# Patient Record
Sex: MALE | Race: AMERICAN INDIAN OR ALASKA NATIVE | NOT HISPANIC OR LATINO | Employment: FULL TIME | URBAN - METROPOLITAN AREA
[De-identification: names, ages, dates, MRNs, and addresses within clinical notes are randomized per-mention and may not be internally consistent; named-entity substitution may affect disease eponyms.]

---

## 2018-05-11 ENCOUNTER — OFFICE VISIT (OUTPATIENT)
Dept: URGENT CARE | Facility: CLINIC | Age: 28
End: 2018-05-11
Payer: COMMERCIAL

## 2018-05-11 ENCOUNTER — HOSPITAL ENCOUNTER (OUTPATIENT)
Facility: MEDICAL CENTER | Age: 28
End: 2018-05-11
Attending: PHYSICIAN ASSISTANT
Payer: COMMERCIAL

## 2018-05-11 VITALS
TEMPERATURE: 99.4 F | RESPIRATION RATE: 18 BRPM | WEIGHT: 225.2 LBS | SYSTOLIC BLOOD PRESSURE: 134 MMHG | OXYGEN SATURATION: 98 % | DIASTOLIC BLOOD PRESSURE: 94 MMHG | HEART RATE: 100 BPM | HEIGHT: 66 IN | BODY MASS INDEX: 36.19 KG/M2

## 2018-05-11 DIAGNOSIS — N45.1 EPIDIDYMITIS: ICD-10-CM

## 2018-05-11 LAB
C TRACH DNA SPEC QL NAA+PROBE: NEGATIVE
N GONORRHOEA DNA SPEC QL NAA+PROBE: NEGATIVE
SPECIMEN SOURCE: NORMAL

## 2018-05-11 PROCEDURE — 87491 CHLMYD TRACH DNA AMP PROBE: CPT

## 2018-05-11 PROCEDURE — 87591 N.GONORRHOEAE DNA AMP PROB: CPT

## 2018-05-11 PROCEDURE — 87086 URINE CULTURE/COLONY COUNT: CPT

## 2018-05-11 PROCEDURE — 99214 OFFICE O/P EST MOD 30 MIN: CPT | Performed by: PHYSICIAN ASSISTANT

## 2018-05-11 ASSESSMENT — ENCOUNTER SYMPTOMS
BACK PAIN: 0
COUGH: 0
FLANK PAIN: 0
FEVER: 0
PALPITATIONS: 0
SHORTNESS OF BREATH: 0
CHILLS: 0

## 2018-05-11 ASSESSMENT — PAIN SCALES - GENERAL: PAINLEVEL: 2=MINIMAL-SLIGHT

## 2018-05-11 NOTE — PROGRESS NOTES
Subjective:      Tano Syed is a 27 y.o. male who presents with Dysuria (burning and now sore testicles, )            Dysuria    This is a new problem. The current episode started in the past 7 days. The pain is mild. He is sexually active. There is no history of pyelonephritis. Pertinent negatives include no chills, flank pain, frequency, hematuria or urgency. Associated symptoms comments: Testicular pain  .       Review of Systems   Constitutional: Negative for chills and fever.   Respiratory: Negative for cough and shortness of breath.    Cardiovascular: Negative for chest pain and palpitations.   Genitourinary: Positive for dysuria. Negative for flank pain, frequency, hematuria and urgency.        Testicular pain.   Musculoskeletal: Negative for back pain.   All other systems reviewed and are negative.    PMH:  has no past medical history on file.  MEDS:   Current Outpatient Prescriptions:   •  acetaminophen (TYLENOL) 325 MG Tab, Take 650 mg by mouth every four hours as needed., Disp: , Rfl:   •  bismuth subsalicylate (PEPTO-BISMOL) 262 MG Chew Tab, Take 524 mg by mouth 4 Times a Day,Before Meals and at Bedtime., Disp: , Rfl:   •  ondansetron (ZOFRAN) 4 MG Tab tablet, Take 1 Tab by mouth every four hours as needed for Nausea/Vomiting., Disp: 20 Tab, Rfl: 1  •  loperamide (IMODIUM) 2 MG Cap, Take 1 Cap by mouth 4 times a day as needed for Diarrhea., Disp: 30 Cap, Rfl: 3  •  tramadol (ULTRAM) 50 MG Tab, Take 1-2 Tabs by mouth every four hours as needed., Disp: 30 Tab, Rfl: 0  •  naproxen (NAPROSYN) 500 MG Tab, Take 500 mg by mouth Once., Disp: , Rfl:   •  ibuprofen (ADVIL) 200 MG Tab, Take 400 mg by mouth every 6 hours as needed for Mild Pain or Inflammation., Disp: , Rfl:   ALLERGIES: No Known Allergies  SURGHX:   Past Surgical History:   Procedure Laterality Date   • MANDIBLE FRACTURE ORIF Left 9/21/2015    Procedure: MANDIBLE FRACTURE ORIF;  Surgeon: Ryan Machado D.M.D.,M.D.;  Location: SURGERY [facility]  "TOWER ORS;  Service:    • OTHER ORTHOPEDIC SURGERY      knee     SOCHX:  reports that he has never smoked. He uses smokeless tobacco. He reports that he drinks about 1.8 oz of alcohol per week . He reports that he does not use drugs.  FH: Family history was reviewed, no pertinent findings to report  Medications, Allergies, and current problem list reviewed today in Epic       Objective:     /94   Pulse 100   Temp 37.4 °C (99.4 °F)   Resp 18   Ht 1.676 m (5' 6\")   Wt 102.2 kg (225 lb 3.2 oz)   SpO2 98%   BMI 36.35 kg/m²      Physical Exam   Constitutional: He is oriented to person, place, and time. He appears well-developed and well-nourished.   Neck: Normal range of motion. Neck supple.   Cardiovascular: Normal rate, regular rhythm and normal heart sounds.    Pulmonary/Chest: Effort normal and breath sounds normal.   Genitourinary: Penis normal. Right testis shows tenderness. Uncircumcised.   Musculoskeletal: He exhibits no tenderness.   No CVA tenderness   Neurological: He is alert and oriented to person, place, and time.   Skin: Skin is warm and dry.   Psychiatric: He has a normal mood and affect. His behavior is normal. Judgment and thought content normal.   Vitals reviewed.              Assessment/Plan:   Patient is a 27-year-old male who presents with burning with urination and testicular pain for a week. He denies any discharge from the penis. He is sexually active. Vitals normal. Tenderness palpated on the backside of the right testes, no swelling. Cremaster reflex present.UA is normal. Most likely epididymitis. We discussed etiology of this diagnosis and he would like to wait for urine cultures to come back before treatment.  1. Epididymitis    - CHLAMYDIA/GC PCR URINE OR SWAB; Future  - POCT Urinalysis  - URINE CULTURE(NEW); Future    Differential diagnosis, natural history, supportive care discussed. Follow-up with primary care provider within 7-10 days, emergency room precautions discussed.  " Patient and/or family appears understanding of information.

## 2018-05-11 NOTE — PATIENT INSTRUCTIONS
Epididymitis  Introduction  Epididymitis is swelling (inflammation) of the epididymis. The epididymis is a cord-like structure that is located along the top and back part of the testicle. It collects and stores sperm from the testicle.  This condition can also cause pain and swelling of the testicle and scrotum. Symptoms usually start suddenly (acute epididymitis). Sometimes epididymitis starts gradually and lasts for a while (chronic epididymitis). This type may be harder to treat.  What are the causes?  In men 35 and younger, this condition is usually caused by a bacterial infection or sexually transmitted disease (STD), such as:  · Gonorrhea.  · Chlamydia.  In men 35 and older who do not have anal sex, this condition is usually caused by bacteria from a blockage or abnormalities in the urinary system. These can result from:  · Having a tube placed into the bladder (urinary catheter).  · Having an enlarged or inflamed prostate gland.  · Having recent urinary tract surgery.  In men who have a condition that weakens the body’s defense system (immune system), such as HIV, this condition can be caused by:  · Other bacteria, including tuberculosis and syphilis.  · Viruses.  · Fungi.  Sometimes this condition occurs without infection. That may happen if urine flows backward into the epididymis after heavy lifting or straining.  What increases the risk?  This condition is more likely to develop in men:  · Who have unprotected sex with more than one partner.  · Who have anal sex.  · Who have recently had surgery.  · Who have a urinary catheter.  · Who have urinary problems.  · Who have a suppressed immune system.  What are the signs or symptoms?  This condition usually begins suddenly with chills, fever, and pain behind the scrotum and in the testicle. Other symptoms include:  · Swelling of the scrotum, testicle, or both.  · Pain when ejaculating or urinating.  · Pain in the back or belly.  · Nausea.  · Itching and  discharge from the penis.  · Frequent need to pass urine.  · Redness and tenderness of the scrotum.  How is this diagnosed?  Your health care provider can diagnose this condition based on your symptoms and medical history. Your health care provider will also do a physical exam to ask about your symptoms and check your scrotum and testicle for swelling, pain, and redness. You may also have other tests, including:  · Examination of discharge from the penis.  · Urine tests for infections, such as STDs.  Your health care provider may test you for other STDs, including HIV.  How is this treated?  Treatment for this condition depends on the cause. If your condition is caused by a bacterial infection, oral antibiotic medicine may be prescribed. If the bacterial infection has spread to your blood, you may need to receive IV antibiotics. Nonbacterial epididymitis is treated with home care that includes bed rest and elevation of the scrotum.  Surgery may be needed to treat:  · Bacterial epididymitis that causes pus to build up in the scrotum (abscess).  · Chronic epididymitis that has not responded to other treatments.  Follow these instructions at home:  Medicines  · Take over-the-counter and prescription medicines only as told by your health care provider.  · If you were prescribed an antibiotic medicine, take it as told by your health care provider. Do not stop taking the antibiotic even if your condition improves.  Sexual Activity  · If your epididymitis was caused by an STD, avoid sexual activity until your treatment is complete.  · Inform your sexual partner or partners if you test positive for an STD. They may need to be treated. Do not engage in sexual activity with your partner or partners until their treatment is completed.  General instructions  · Return to your normal activities as told by your health care provider. Ask your health care provider what activities are safe for you.  · Keep your scrotum elevated and  supported while resting. Ask your health care provider if you should wear a scrotal support, such as a jockstrap. Wear it as told by your health care provider.  · If directed, apply ice to the affected area:  ¨ Put ice in a plastic bag.  ¨ Place a towel between your skin and the bag.  ¨ Leave the ice on for 20 minutes, 2-3 times per day.  · Try taking a sitz bath to help with discomfort. This is a warm water bath that is taken while you are sitting down. The water should only come up to your hips and should cover your buttocks. Do this 3-4 times per day or as told by your health care provider.  · Keep all follow-up visits as told by your health care provider. This is important.  Contact a health care provider if:  · You have a fever.  · Your pain medicine is not helping.  · Your pain is getting worse.  · Your symptoms do not improve within three days.  This information is not intended to replace advice given to you by your health care provider. Make sure you discuss any questions you have with your health care provider.  Document Released: 12/15/2001 Document Revised: 05/25/2017 Document Reviewed: 05/04/2016  © 2017 [employer]

## 2018-05-12 ENCOUNTER — OFFICE VISIT (OUTPATIENT)
Dept: URGENT CARE | Facility: CLINIC | Age: 28
End: 2018-05-12
Payer: COMMERCIAL

## 2018-05-12 VITALS
HEART RATE: 78 BPM | DIASTOLIC BLOOD PRESSURE: 72 MMHG | TEMPERATURE: 98.1 F | SYSTOLIC BLOOD PRESSURE: 122 MMHG | OXYGEN SATURATION: 95 % | HEIGHT: 66 IN | WEIGHT: 225 LBS | BODY MASS INDEX: 36.16 KG/M2 | RESPIRATION RATE: 18 BRPM

## 2018-05-12 DIAGNOSIS — N45.1 EPIDIDYMITIS: ICD-10-CM

## 2018-05-12 DIAGNOSIS — Z72.51 UNPROTECTED SEX: ICD-10-CM

## 2018-05-12 PROCEDURE — 99214 OFFICE O/P EST MOD 30 MIN: CPT | Performed by: PHYSICIAN ASSISTANT

## 2018-05-12 RX ORDER — AZITHROMYCIN 1 G/1
1 POWDER, FOR SUSPENSION ORAL ONCE
Qty: 1 EACH | Refills: 0 | Status: SHIPPED | OUTPATIENT
Start: 2018-05-12 | End: 2018-05-12

## 2018-05-12 RX ORDER — CEFTRIAXONE SODIUM 250 MG/1
250 INJECTION, POWDER, FOR SOLUTION INTRAMUSCULAR; INTRAVENOUS ONCE
Status: COMPLETED | OUTPATIENT
Start: 2018-05-12 | End: 2018-05-12

## 2018-05-12 RX ADMIN — CEFTRIAXONE SODIUM 250 MG: 250 INJECTION, POWDER, FOR SOLUTION INTRAMUSCULAR; INTRAVENOUS at 10:44

## 2018-05-12 ASSESSMENT — ENCOUNTER SYMPTOMS
FEVER: 0
CHILLS: 0
FLANK PAIN: 0
NAUSEA: 0
COUGH: 0
VOMITING: 0
MYALGIAS: 0

## 2018-05-12 NOTE — PROGRESS NOTES
"Subjective:      Tano Syed is a 27 y.o. male who presents with Testicular Pain (x4 days)            Subjective: Patient is a 27-year-old male who was seen yesterday for intermittent testicular discomfort on the right side as well.  He was diagnosed with came back today as he said yesterday he elected not to be treated.  Today's 66 to be treated.  Patient denies any increased frequency with urination, increased urgency or hesitancy.  Denies did have one episode of relation 2 days ago.  States he talked with 2 female partners and they  told him that no symptoms.  He denies fever, vomiting, abdominal    Is    Review of Systems   Constitutional: Negative for chills and fever.   Respiratory: Negative for cough.    Gastrointestinal: Negative for nausea and vomiting.   Genitourinary: Negative for dysuria, flank pain, frequency, hematuria and urgency.   Musculoskeletal: Negative for myalgias.   Skin: Negative for itching and rash.          Objective:     /72   Pulse 78   Temp 36.7 °C (98.1 °F)   Resp 18   Ht 1.676 m (5' 6\")   Wt 102.1 kg (225 lb)   SpO2 95%   BMI 36.32 kg/m²      Physical Exam       Gen.: Patient is A and O ×3, no acute distress, well-nourished well-hydrated  Vitals: Are listed and unremarkable  HEENT: Heads normocephalic, atraumatic, PERRLA, extraocular movements intact, TMs and oropharynx clear  Neck: Soft supple without cervical lymphadenopathy  Cardiovascular: Regular rate and rhythm normal S1 and S2. No murmurs, rubs or gallops  Lungs are clear to auscultation bilaterally. no wheezes rales or rhonchi  Abdomen is soft, nontender, nondistended with good bowel sounds, no hepatosplenomegaly  Skin: Is well perfused without evidence of rash or lesions  Neurological: Intact  Musculoskeletal: Full range of motion, 5 out of 5 strength against resistance  Neurovascularly: Intact   Gentle examination: Uncircumcised, testicles have descended.  No testicular pain to palpation or swelling noted.  No " pain in the epididymal region to palpation or swelling noted.  No palpable masses.  Penile shaft is unremarkable    Urgent CARE course: Urine  culture is pending.  Urine gonorrhea and chlamydia were negative     Assessment/Plan:     1. Unprotected sex  2.  Suspected history of Epididymitis  Discussed with patient my recommendation for an ultrasound of the testicles.  Patient defers.  Did discuss risks of refusal.  He would like to follow-up with primary care.  I did put in a referral for ultrasound.  Although urine GC chlamydia were negative given his age group and history of unprotected sex I will cover him for gonorrhea and chlamydia.  I will follow pending urine culture  - cefTRIAXone (ROCEPHIN) injection 250 mg; 250 mg by Intramuscular route Once.  - azithromycin (ZITHROMAX) 1 GM powder; Take 1 Packet by mouth Once for 1 dose.  Dispense: 1 Each; Refill: 0

## 2018-05-13 LAB
BACTERIA UR CULT: NORMAL
SIGNIFICANT IND 70042: NORMAL
SITE SITE: NORMAL
SOURCE SOURCE: NORMAL

## 2018-06-08 ENCOUNTER — OFFICE VISIT (OUTPATIENT)
Dept: MEDICAL GROUP | Facility: MEDICAL CENTER | Age: 28
End: 2018-06-08
Payer: COMMERCIAL

## 2018-06-08 VITALS
BODY MASS INDEX: 36.28 KG/M2 | DIASTOLIC BLOOD PRESSURE: 88 MMHG | WEIGHT: 225.75 LBS | SYSTOLIC BLOOD PRESSURE: 120 MMHG | TEMPERATURE: 98.2 F | OXYGEN SATURATION: 96 % | HEART RATE: 87 BPM | HEIGHT: 66 IN

## 2018-06-08 DIAGNOSIS — E66.9 OBESITY (BMI 30-39.9): ICD-10-CM

## 2018-06-08 DIAGNOSIS — R59.9 SWELLING OF LYMPH NODE: ICD-10-CM

## 2018-06-08 DIAGNOSIS — Z11.3 SCREEN FOR STD (SEXUALLY TRANSMITTED DISEASE): ICD-10-CM

## 2018-06-08 DIAGNOSIS — Z76.89 ENCOUNTER TO ESTABLISH CARE: ICD-10-CM

## 2018-06-08 PROCEDURE — 99214 OFFICE O/P EST MOD 30 MIN: CPT | Performed by: PHYSICIAN ASSISTANT

## 2018-06-08 ASSESSMENT — PATIENT HEALTH QUESTIONNAIRE - PHQ9
SUM OF ALL RESPONSES TO PHQ QUESTIONS 1-9: 6
5. POOR APPETITE OR OVEREATING: 0 - NOT AT ALL
CLINICAL INTERPRETATION OF PHQ2 SCORE: 5

## 2018-06-08 NOTE — PROGRESS NOTES
"Subjective:   Tano Syed is a 27 y.o. male here today for STD screening and possible lymph node swelling.    Screen for STD (sexually transmitted disease)  This is a 27-year-old male who is here today to establish care. He was recently seen by my wife in urgent care. Treated for gonorrhea Chlamydia. Testing returned negative. He has been in a steady relationship but did sleep on his girlfriend twice with other women while they were breaking up. This is caused him significant anxiety. His also been waking up with swollen lymph nodes and recess what he thinks. He states that they're tender when he rubs them. He has a little amount of anxiety regarding possible HIV. Has no other chronic medical conditions. Doesn't smoke marijuana but did use kratom for a short time. He has no chronic medical conditions. Takes no medications.       Current medicines (including changes today)  No current outpatient prescriptions on file.     No current facility-administered medications for this visit.      He  has no past medical history on file.    Social History and Family History were reviewed and updated.    ROS   No chest pain, no shortness of breath, no abdominal pain and all other systems were reviewed and are negative.       Objective:     Blood pressure 120/88, pulse 87, temperature 36.8 °C (98.2 °F), height 1.676 m (5' 6\"), weight 102.4 kg (225 lb 12 oz), SpO2 96 %. Body mass index is 36.44 kg/m².   Physical Exam:  Constitutional: Alert, no distress.  Skin: Warm, dry, good turgor, no rashes in visible areas.  Eye: Equal, round and reactive, conjunctiva clear, lids normal.  ENMT: Lips without lesions, good dentition, oropharynx clear.  Neck: Trachea midline, no masses.   Lymph: No cervical or supraclavicular lymphadenopathy  Respiratory: Unlabored respiratory effort, lungs clear to auscultation, no wheezes, no ronchi.  Cardiovascular: Normal S1, S2, no murmur, no edema.  Abdomen: Soft, non-tender, no masses.  Psych: Alert and " oriented x3, normal affect and mood.        Assessment and Plan:   The following treatment plan was discussed    1. Screen for STD (sexually transmitted disease)  Ordered labs to evaluate for STDs. Asymptomatic today.  - CBC WITH DIFFERENTIAL; Future  - HSV II SPECIFIC IGG AB; Future  - HIV AG/AB COMBO ASSAY DIAGNOSTIC; Future  - RPR (SYPHILIS); Future  - HEPATITIS PANEL ACUTE(4 COMPONENTS); Future    2. Swelling of lymph node  Acute, new onset condition. Resolved. No lymphadenopathy noted today. Discussed in length regarding lymphadenopathy as causes and progression.  - CBC WITH DIFFERENTIAL; Future    3. Obesity (BMI 30-39.9)  Will monitor. Chronic condition.  - Patient identified as having weight management issue.  Appropriate orders and counseling given.    4. Encounter to establish care        Followup: Return if symptoms worsen or fail to improve.    Please note that this dictation was created using voice recognition software. I have made every reasonable attempt to correct obvious errors, but I expect that there are errors of grammar and possibly content that I did not discover before finalizing the note.

## 2018-06-12 ENCOUNTER — TELEPHONE (OUTPATIENT)
Dept: MEDICAL GROUP | Facility: MEDICAL CENTER | Age: 28
End: 2018-06-12

## 2018-06-12 NOTE — TELEPHONE ENCOUNTER
1. Caller Name: Tano Syed                                         Call Back Number: [phone]      Patient approves a detailed voicemail message: yes    Pt called and LM asking for lab results. Returned Pt's call to find out where labs were drawn at as we do not have them in the system. LM requesting for Pt to call and give this information so I can request records.

## 2018-06-13 ENCOUNTER — TELEPHONE (OUTPATIENT)
Dept: MEDICAL GROUP | Facility: LAB | Age: 28
End: 2018-06-13

## 2018-06-13 LAB
ALBUMIN SERPL-MCNC: 4.6 G/DL (ref 3.5–5.5)
ALP SERPL-CCNC: 76 IU/L (ref 39–117)
ALT SERPL-CCNC: 22 IU/L (ref 0–44)
AST SERPL-CCNC: 22 IU/L (ref 0–40)
BASOPHILS # BLD AUTO: 0.1 X10E3/UL (ref 0–0.2)
BASOPHILS NFR BLD AUTO: 1 %
BILIRUB DIRECT SERPL-MCNC: 0.09 MG/DL (ref 0–0.4)
BILIRUB SERPL-MCNC: 0.3 MG/DL (ref 0–1.2)
EOSINOPHIL # BLD AUTO: 0.3 X10E3/UL (ref 0–0.4)
EOSINOPHIL NFR BLD AUTO: 3 %
ERYTHROCYTE [DISTWIDTH] IN BLOOD BY AUTOMATED COUNT: 13.6 % (ref 12.3–15.4)
HCT VFR BLD AUTO: 46.4 % (ref 37.5–51)
HGB BLD-MCNC: 15.7 G/DL (ref 13–17.7)
HIV 1+2 AB+HIV1 P24 AG SERPL QL IA: NON REACTIVE
HSV1 IGM TITR SER IF: NORMAL TITER
HSV2 IGM TITR SER IF: NORMAL TITER
IMM GRANULOCYTES # BLD: 0 X10E3/UL (ref 0–0.1)
IMM GRANULOCYTES NFR BLD: 0 %
IMMATURE CELLS  115398: NORMAL
LYMPHOCYTES # BLD AUTO: 3.1 X10E3/UL (ref 0.7–3.1)
LYMPHOCYTES NFR BLD AUTO: 37 %
MCH RBC QN AUTO: 31.6 PG (ref 26.6–33)
MCHC RBC AUTO-ENTMCNC: 33.8 G/DL (ref 31.5–35.7)
MCV RBC AUTO: 93 FL (ref 79–97)
MONOCYTES # BLD AUTO: 0.6 X10E3/UL (ref 0.1–0.9)
MONOCYTES NFR BLD AUTO: 7 %
MORPHOLOGY BLD-IMP: NORMAL
NEUTROPHILS # BLD AUTO: 4.5 X10E3/UL (ref 1.4–7)
NEUTROPHILS NFR BLD AUTO: 52 %
NRBC BLD AUTO-RTO: NORMAL %
PLATELET # BLD AUTO: 225 X10E3/UL (ref 150–379)
PROT SERPL-MCNC: 7.8 G/DL (ref 6–8.5)
RBC # BLD AUTO: 4.97 X10E6/UL (ref 4.14–5.8)
RPR SER QL: NON REACTIVE
WBC # BLD AUTO: 8.5 X10E3/UL (ref 3.4–10.8)

## 2018-06-13 NOTE — TELEPHONE ENCOUNTER
----- Message from Katia Eagle P.A.-C. sent at 6/12/2018  5:02 PM PDT -----  Please inform pt that all labs were normal, we are just waiting for the hepatitis panel which I called [facility] and they will start running that soon but no results for that yet.   We will call when we get them,   Thank you  Katia

## 2018-06-18 LAB
HAV IGM SERPL QL IA: NEGATIVE
HBV CORE IGM SERPL QL IA: NEGATIVE
HBV SURFACE AG SERPL QL IA: NEGATIVE
HCV AB S/CO SERPL IA: <0.1 S/CO RATIO (ref 0–0.9)
WRITTEN AUTHORIZATION   977900: NORMAL

## 2018-07-17 ENCOUNTER — OFFICE VISIT (OUTPATIENT)
Dept: MEDICAL GROUP | Facility: MEDICAL CENTER | Age: 28
End: 2018-07-17
Payer: COMMERCIAL

## 2018-07-17 VITALS
OXYGEN SATURATION: 96 % | WEIGHT: 238 LBS | DIASTOLIC BLOOD PRESSURE: 64 MMHG | HEART RATE: 79 BPM | SYSTOLIC BLOOD PRESSURE: 122 MMHG | TEMPERATURE: 97.9 F | BODY MASS INDEX: 38.25 KG/M2 | HEIGHT: 66 IN | RESPIRATION RATE: 14 BRPM

## 2018-07-17 DIAGNOSIS — Z00.00 PREVENTATIVE HEALTH CARE: ICD-10-CM

## 2018-07-17 DIAGNOSIS — Z11.3 SCREEN FOR STD (SEXUALLY TRANSMITTED DISEASE): ICD-10-CM

## 2018-07-17 PROBLEM — R59.9 SWELLING OF LYMPH NODE: Status: RESOLVED | Noted: 2018-06-08 | Resolved: 2018-07-17

## 2018-07-17 PROCEDURE — 99213 OFFICE O/P EST LOW 20 MIN: CPT | Performed by: PHYSICIAN ASSISTANT

## 2018-07-18 NOTE — PROGRESS NOTES
"Subjective:   Tano Syed is a 27 y.o. male here today for STD lab as well as check for diabetes.    Screen for STD (sexually transmitted disease)  This is a 27-year-old male who is concerned about recent dealings with balanitis. Denies any symptoms today but is concerned about possible diabetes as well as wants another herpes 2 testing. Last one performed was less than 1.10. This was performed at [facility]. He denies any dysuria. No new lesions. Recently has increased his alcohol because of his concerns and relations with his girlfriend. They're trying to work it out. Because of this he has been drinking on days he doesn't work. He will drink about 10 drinks at a time. Used to smoke marijuana but because of his job he is unable to smoke.       Current medicines (including changes today)  No current outpatient prescriptions on file.     No current facility-administered medications for this visit.      He  has no past medical history on file.    Social History and Family History were reviewed and updated.    ROS   No chest pain, no shortness of breath, no abdominal pain and all other systems were reviewed and are negative.       Objective:     Blood pressure 122/64, pulse 79, temperature 36.6 °C (97.9 °F), resp. rate 14, height 1.676 m (5' 6\"), weight 108 kg (238 lb), SpO2 96 %. Body mass index is 38.41 kg/m².   Physical Exam:  Constitutional: Alert, no distress.  Skin: Warm, dry, good turgor, no rashes in visible areas.  Eye: Equal, round and reactive, conjunctiva clear, lids normal.  ENMT: Lips without lesions, good dentition, oropharynx clear.  Neck: Trachea midline, no masses.   Lymph: No cervical or supraclavicular lymphadenopathy  Respiratory: Unlabored respiratory effort, lungs appear clear, no wheezes.  Cardiovascular: Normal S1, S2, no murmur, no edema.  Abdomen: Soft, non-tender, no masses.  Psych: Alert and oriented x3, normal affect and mood.        Assessment and Plan:   The following treatment plan was " discussed    1. Screen for STD (sexually transmitted disease)  Herpes 2 ordered. Asymptomatic. Advised to cease the concern if his labs returned within normal limits.  - HSV II SPECIFIC IGG AB; Future    2. Preventative health care  Ordered hemoglobin A1c and CMP. Concerned about his alcohol consumption as well as possibility of developing diabetes.  - HEMOGLOBIN A1C; Future  - COMP METABOLIC PANEL; Future    Patient was seen for 15 minutes face to face of which > 50% of appointment time was spent on counseling and coordination of care regarding the above.      Followup: Return if symptoms worsen or fail to improve.    Please note that this dictation was created using voice recognition software. I have made every reasonable attempt to correct obvious errors, but I expect that there are errors of grammar and possibly content that I did not discover before finalizing the note.

## 2018-07-18 NOTE — ASSESSMENT & PLAN NOTE
This is a 27-year-old male who is concerned about recent dealings with balanitis. Denies any symptoms today but is concerned about possible diabetes as well as wants another herpes 2 testing. Last one performed was less than 1.10. This was performed at [facility]. He denies any dysuria. No new lesions. Recently has increased his alcohol because of his concerns and relations with his girlfriend. They're trying to work it out. Because of this he has been drinking on days he doesn't work. He will drink about 10 drinks at a time. Used to smoke marijuana but because of his job he is unable to smoke.

## 2018-07-20 ENCOUNTER — HOSPITAL ENCOUNTER (OUTPATIENT)
Dept: LAB | Facility: MEDICAL CENTER | Age: 28
End: 2018-07-20
Attending: PHYSICIAN ASSISTANT
Payer: COMMERCIAL

## 2018-07-20 DIAGNOSIS — Z00.00 PREVENTATIVE HEALTH CARE: ICD-10-CM

## 2018-07-20 DIAGNOSIS — Z11.3 SCREEN FOR STD (SEXUALLY TRANSMITTED DISEASE): ICD-10-CM

## 2018-07-20 LAB
ALBUMIN SERPL BCP-MCNC: 4.8 G/DL (ref 3.2–4.9)
ALBUMIN/GLOB SERPL: 1.4 G/DL
ALP SERPL-CCNC: 55 U/L (ref 30–99)
ALT SERPL-CCNC: 21 U/L (ref 2–50)
ANION GAP SERPL CALC-SCNC: 9 MMOL/L (ref 0–11.9)
AST SERPL-CCNC: 23 U/L (ref 12–45)
BILIRUB SERPL-MCNC: 0.6 MG/DL (ref 0.1–1.5)
BUN SERPL-MCNC: 19 MG/DL (ref 8–22)
CALCIUM SERPL-MCNC: 9.9 MG/DL (ref 8.5–10.5)
CHLORIDE SERPL-SCNC: 106 MMOL/L (ref 96–112)
CO2 SERPL-SCNC: 23 MMOL/L (ref 20–33)
CREAT SERPL-MCNC: 1.08 MG/DL (ref 0.5–1.4)
EST. AVERAGE GLUCOSE BLD GHB EST-MCNC: 120 MG/DL
GLOBULIN SER CALC-MCNC: 3.5 G/DL (ref 1.9–3.5)
GLUCOSE SERPL-MCNC: 92 MG/DL (ref 65–99)
HBA1C MFR BLD: 5.8 % (ref 0–5.6)
POTASSIUM SERPL-SCNC: 3.9 MMOL/L (ref 3.6–5.5)
PROT SERPL-MCNC: 8.3 G/DL (ref 6–8.2)
SODIUM SERPL-SCNC: 138 MMOL/L (ref 135–145)

## 2018-07-20 PROCEDURE — 86696 HERPES SIMPLEX TYPE 2 TEST: CPT

## 2018-07-20 PROCEDURE — 83036 HEMOGLOBIN GLYCOSYLATED A1C: CPT

## 2018-07-20 PROCEDURE — 36415 COLL VENOUS BLD VENIPUNCTURE: CPT

## 2018-07-20 PROCEDURE — 80053 COMPREHEN METABOLIC PANEL: CPT

## 2018-07-22 LAB — HSV2 GG IGG SER-ACNC: 0.07 IV

## 2018-07-24 ENCOUNTER — TELEPHONE (OUTPATIENT)
Dept: MEDICAL GROUP | Facility: MEDICAL CENTER | Age: 28
End: 2018-07-24

## 2018-07-24 NOTE — TELEPHONE ENCOUNTER
----- Message from Cliff Tineo P.A.-C. sent at 7/23/2018  6:08 PM PDT -----  Please contact.  Labs good.  Only abnormal value was A1c at 5.8 so he has prediabetes.  Exercise routinely and eat healthier.  Follow-up and repeat in one year.  Thank you.    Cliff

## 2018-07-24 NOTE — TELEPHONE ENCOUNTER
Spoke to Pt, results given as well as notified to work on diet and exercise. Pt understanding and will follow up in one year.

## 2019-08-01 NOTE — ASSESSMENT & PLAN NOTE
This is a 27-year-old male who is here today to establish care. He was recently seen by my wife in urgent care. Treated for gonorrhea Chlamydia. Testing returned negative. He has been in a steady relationship but did sleep on his girlfriend twice with other women while they were breaking up. This is caused him significant anxiety. His also been waking up with swollen lymph nodes and recess what he thinks. He states that they're tender when he rubs them. He has a little amount of anxiety regarding possible HIV. Has no other chronic medical conditions. Doesn't smoke marijuana but did use kratom for a short time. He has no chronic medical conditions. Takes no medications.   chest discomfort and back pain x 1 month, was seen at cardiologist with negative workup

## 2019-11-01 ENCOUNTER — OFFICE VISIT (OUTPATIENT)
Dept: URGENT CARE | Facility: PHYSICIAN GROUP | Age: 29
End: 2019-11-01
Payer: COMMERCIAL

## 2019-11-01 ENCOUNTER — HOSPITAL ENCOUNTER (OUTPATIENT)
Facility: MEDICAL CENTER | Age: 29
End: 2019-11-01
Attending: PHYSICIAN ASSISTANT
Payer: COMMERCIAL

## 2019-11-01 VITALS
SYSTOLIC BLOOD PRESSURE: 138 MMHG | OXYGEN SATURATION: 97 % | DIASTOLIC BLOOD PRESSURE: 78 MMHG | RESPIRATION RATE: 16 BRPM | WEIGHT: 240 LBS | TEMPERATURE: 99.4 F | HEART RATE: 98 BPM | BODY MASS INDEX: 38.74 KG/M2

## 2019-11-01 DIAGNOSIS — Z20.2 EXPOSURE TO STD: ICD-10-CM

## 2019-11-01 PROCEDURE — 99214 OFFICE O/P EST MOD 30 MIN: CPT | Performed by: PHYSICIAN ASSISTANT

## 2019-11-01 PROCEDURE — 87591 N.GONORRHOEAE DNA AMP PROB: CPT

## 2019-11-01 PROCEDURE — 87491 CHLMYD TRACH DNA AMP PROBE: CPT

## 2019-11-01 RX ORDER — AZITHROMYCIN 500 MG/1
1000 TABLET, FILM COATED ORAL ONCE
Qty: 2 TAB | Refills: 0 | Status: SHIPPED | OUTPATIENT
Start: 2019-11-01 | End: 2019-11-01

## 2019-11-01 NOTE — LETTER
November 1, 2019         Patient: Tano Syed   YOB: 1990   Date of Visit: 11/1/2019           To Whom it May Concern:    Tano Syed was seen in my clinic on 11/1/2019.     If you have any questions or concerns, please don't hesitate to call.        Sincerely,           Anju Castorena P.A.-C.  Electronically Signed

## 2019-11-02 DIAGNOSIS — Z20.2 EXPOSURE TO STD: ICD-10-CM

## 2019-11-06 ASSESSMENT — ENCOUNTER SYMPTOMS
VOMITING: 0
DIZZINESS: 0
FLANK PAIN: 0
SHORTNESS OF BREATH: 0
BLURRED VISION: 0
SORE THROAT: 0
EYE REDNESS: 0
ABDOMINAL PAIN: 0
NAUSEA: 0
EYE DISCHARGE: 0
FEVER: 0
PALPITATIONS: 0
CHILLS: 0

## 2019-11-06 NOTE — PROGRESS NOTES
Subjective:      Robles Syed is a 28 y.o. male who presents with Exposure to STD    HPI:  This is a new problem. Tano Syed is a 28 y.o. male who presents today for evaluation of possible exposure to chlamydia.  Patient states that 1 of his recent sexual partners contacted him to inform him that she tested positive for chlamydia.  He is not having any symptoms.  No abnormal penile discharge, joint pain, abdominal pain, burning with urination, rash, or fever/chills.  Patient states that he has no history of STDs.  No other complaints today.      Review of Systems   Constitutional: Negative for chills, fever and malaise/fatigue.   HENT: Negative for sore throat.    Eyes: Negative for blurred vision, discharge and redness.   Respiratory: Negative for shortness of breath.    Cardiovascular: Negative for chest pain and palpitations.   Gastrointestinal: Negative for abdominal pain, nausea and vomiting.   Genitourinary: Negative for dysuria, flank pain, frequency, hematuria and urgency.   Musculoskeletal: Negative for joint pain.   Skin: Negative for rash.   Neurological: Negative for dizziness.       PMH:  has no past medical history on file.  MEDS: No current outpatient medications on file.  ALLERGIES: No Known Allergies  SURGHX:   Past Surgical History:   Procedure Laterality Date   • MANDIBLE FRACTURE ORIF Left 9/21/2015    Procedure: MANDIBLE FRACTURE ORIF;  Surgeon: Ryan Machado D.M.D.,M.D.;  Location: SURGERY [facility];  Service:    • OTHER ORTHOPEDIC SURGERY      knee     SOCHX:  reports that he has never smoked. His smokeless tobacco use includes chew. He reports current alcohol use of about 4.8 oz of alcohol per week. He reports that he does not use drugs.  FH: Family history was reviewed, no pertinent findings to report     Objective:     /78   Pulse 98   Temp 37.4 °C (99.4 °F)   Resp 16   Wt 108.9 kg (240 lb)   SpO2 97%   BMI 38.74 kg/m²      Physical Exam  Constitutional:        Appearance: He is well-developed.   HENT:      Head: Normocephalic and atraumatic.      Right Ear: External ear normal.      Left Ear: External ear normal.   Eyes:      Conjunctiva/sclera: Conjunctivae normal.      Pupils: Pupils are equal, round, and reactive to light.   Neck:      Musculoskeletal: Normal range of motion.   Cardiovascular:      Rate and Rhythm: Normal rate and regular rhythm.      Heart sounds: Normal heart sounds. No murmur.   Pulmonary:      Effort: Pulmonary effort is normal.      Breath sounds: Normal breath sounds. No wheezing.   Genitourinary:     Comments: * exam deferred as patient was not having symptoms  Musculoskeletal:      Comments: Normal range of motion in all extremities   Lymphadenopathy:      Cervical: No cervical adenopathy.   Skin:     General: Skin is warm and dry.      Capillary Refill: Capillary refill takes less than 2 seconds.   Neurological:      Mental Status: He is alert and oriented to person, place, and time.   Psychiatric:         Behavior: Behavior normal.         Judgment: Judgment normal.            Assessment/Plan:     1. Exposure to STD  - cefTRIAXone (ROCEPHIN) 250 mg, lidocaine (XYLOCAINE) 1 % 0.9 mL for IM use  - azithromycin (ZITHROMAX) 500 MG tablet; Take 2 Tabs by mouth Once for 1 dose.  Dispense: 2 Tab; Refill: 0  - CHLAMYDIA/GC PCR URINE OR SWAB; Future        *Patient was notified of negative gonorrhea/chlamydia results.  No further action needed.

## 2021-08-05 ENCOUNTER — HOSPITAL ENCOUNTER (OUTPATIENT)
Facility: MEDICAL CENTER | Age: 31
End: 2021-08-05
Attending: NURSE PRACTITIONER
Payer: COMMERCIAL

## 2021-08-05 ENCOUNTER — OFFICE VISIT (OUTPATIENT)
Dept: URGENT CARE | Facility: PHYSICIAN GROUP | Age: 31
End: 2021-08-05
Payer: COMMERCIAL

## 2021-08-05 VITALS
RESPIRATION RATE: 18 BRPM | OXYGEN SATURATION: 99 % | SYSTOLIC BLOOD PRESSURE: 118 MMHG | WEIGHT: 247 LBS | HEIGHT: 64 IN | DIASTOLIC BLOOD PRESSURE: 74 MMHG | TEMPERATURE: 98.5 F | HEART RATE: 88 BPM | BODY MASS INDEX: 42.17 KG/M2

## 2021-08-05 DIAGNOSIS — Z20.822 SUSPECTED COVID-19 VIRUS INFECTION: ICD-10-CM

## 2021-08-05 PROCEDURE — U0005 INFEC AGEN DETEC AMPLI PROBE: HCPCS

## 2021-08-05 PROCEDURE — 99213 OFFICE O/P EST LOW 20 MIN: CPT | Mod: CS | Performed by: NURSE PRACTITIONER

## 2021-08-05 PROCEDURE — U0003 INFECTIOUS AGENT DETECTION BY NUCLEIC ACID (DNA OR RNA); SEVERE ACUTE RESPIRATORY SYNDROME CORONAVIRUS 2 (SARS-COV-2) (CORONAVIRUS DISEASE [COVID-19]), AMPLIFIED PROBE TECHNIQUE, MAKING USE OF HIGH THROUGHPUT TECHNOLOGIES AS DESCRIBED BY CMS-2020-01-R: HCPCS

## 2021-08-05 ASSESSMENT — ENCOUNTER SYMPTOMS
SORE THROAT: 0
SPUTUM PRODUCTION: 0
NAUSEA: 0
MYALGIAS: 1
HEADACHES: 1
DIARRHEA: 0
FEVER: 0
WHEEZING: 0
EYE DISCHARGE: 0
ORTHOPNEA: 0
CHILLS: 0
SHORTNESS OF BREATH: 0
COUGH: 1

## 2021-08-05 NOTE — PROGRESS NOTES
Subjective:      Robles Syed is a 30 y.o. male who presents with Congestion and Fever (x5 days)            HPI   New problem. This is a 30-year-old male who presents with a 5-day history of congestion, cough, chills, and fever. He is not febrile in clinic today. He reports a dry cough but no shortness of breath or wheezing. He denies nausea, diarrhea, headache, or or dizziness. He denies loss of taste or smell. He has not been vaccinated for COVID-19. He has been taking over-the-counter DayQuil NyQuil for his symptoms. He is requesting a Covid test for today.    Patient has no known allergies.  Current Outpatient Medications on File Prior to Visit   Medication Sig Dispense Refill   • Pseudoephedrine-APAP-DM (DAYQUIL PO) Take  by mouth.       No current facility-administered medications on file prior to visit.     Social History     Socioeconomic History   • Marital status: Single     Spouse name: Not on file   • Number of children: Not on file   • Years of education: Not on file   • Highest education level: Not on file   Occupational History   • Not on file   Tobacco Use   • Smoking status: Never Smoker   • Smokeless tobacco: Current User     Types: Chew   • Tobacco comment: Rare    Substance and Sexual Activity   • Alcohol use: Yes     Alcohol/week: 4.8 oz     Types: 3 Shots of liquor, 5 Cans of beer per week     Comment: occ   • Drug use: No     Types: Inhaled   • Sexual activity: Yes     Partners: Female     Birth control/protection: Pill   Other Topics Concern   • Not on file   Social History Narrative   • Not on file     Social Determinants of Health     Financial Resource Strain:    • Difficulty of Paying Living Expenses:    Food Insecurity:    • Worried About Running Out of Food in the Last Year:    • Ran Out of Food in the Last Year:    Transportation Needs:    • Lack of Transportation (Medical):    • Lack of Transportation (Non-Medical):    Physical Activity:    • Days of Exercise per Week:    • Minutes of  "Exercise per Session:    Stress:    • Feeling of Stress :    Social Connections:    • Frequency of Communication with Friends and Family:    • Frequency of Social Gatherings with Friends and Family:    • Attends Quaker Services:    • Active Member of Clubs or Organizations:    • Attends Club or Organization Meetings:    • Marital Status:    Intimate Partner Violence:    • Fear of Current or Ex-Partner:    • Emotionally Abused:    • Physically Abused:    • Sexually Abused:      Breast Cancer-related family history is not on file.      Review of Systems   Constitutional: Positive for malaise/fatigue. Negative for chills and fever.   HENT: Positive for congestion. Negative for sore throat.    Eyes: Negative for discharge.   Respiratory: Positive for cough. Negative for sputum production, shortness of breath and wheezing.    Cardiovascular: Negative for chest pain and orthopnea.   Gastrointestinal: Negative for diarrhea and nausea.   Musculoskeletal: Positive for myalgias.   Neurological: Positive for headaches.   Endo/Heme/Allergies: Negative for environmental allergies.          Objective:     /74   Pulse 88   Temp 36.9 °C (98.5 °F)   Resp 18   Ht 1.626 m (5' 4\")   Wt 112 kg (247 lb)   SpO2 99%   BMI 42.40 kg/m²      Physical Exam  Vitals and nursing note reviewed.   Constitutional:       General: He is not in acute distress.     Appearance: He is well-developed.   HENT:      Head: Normocephalic and atraumatic.      Right Ear: Tympanic membrane, ear canal and external ear normal. No middle ear effusion. Tympanic membrane is not injected or perforated.      Left Ear: Tympanic membrane, ear canal and external ear normal.  No middle ear effusion. Tympanic membrane is not injected or perforated.      Nose: Mucosal edema and congestion present.   Eyes:      General:         Right eye: No discharge.         Left eye: No discharge.      Conjunctiva/sclera: Conjunctivae normal.   Cardiovascular:      Rate and " Rhythm: Normal rate and regular rhythm.      Heart sounds: Normal heart sounds. No murmur heard.     Pulmonary:      Effort: Pulmonary effort is normal. No respiratory distress.      Breath sounds: Normal breath sounds. No wheezing.   Musculoskeletal:         General: Normal range of motion.      Cervical back: Normal range of motion and neck supple.      Comments: Normal movement of all 4 extremities.   Lymphadenopathy:      Cervical: No cervical adenopathy.      Upper Body:      Right upper body: No supraclavicular adenopathy.      Left upper body: No supraclavicular adenopathy.   Skin:     General: Skin is warm and dry.   Neurological:      Mental Status: He is alert and oriented to person, place, and time.      Gait: Gait normal.   Psychiatric:         Behavior: Behavior normal.         Thought Content: Thought content normal.                        Assessment/Plan:        1. Suspected COVID-19 virus infection  COVID/SARS CoV-2 PCR     Patient is counseled on quarantine protocol per CDC guidelines until his Covid test result comes back. He is advised that he may continue the current over-the-counter medications that he has been on. He is to follow-up if symptoms are not improving in the next 10 days to 2 weeks.

## 2021-08-06 DIAGNOSIS — Z20.822 SUSPECTED COVID-19 VIRUS INFECTION: ICD-10-CM

## 2021-08-06 LAB — COVID ORDER STATUS COVID19: NORMAL

## 2021-08-07 LAB
SARS-COV-2 RNA RESP QL NAA+PROBE: DETECTED
SPECIMEN SOURCE: ABNORMAL

## 2021-08-09 ENCOUNTER — APPOINTMENT (OUTPATIENT)
Dept: RADIOLOGY | Facility: MEDICAL CENTER | Age: 31
End: 2021-08-09
Attending: EMERGENCY MEDICINE
Payer: COMMERCIAL

## 2021-08-09 ENCOUNTER — HOSPITAL ENCOUNTER (EMERGENCY)
Facility: MEDICAL CENTER | Age: 31
End: 2021-08-09
Attending: EMERGENCY MEDICINE
Payer: COMMERCIAL

## 2021-08-09 VITALS
SYSTOLIC BLOOD PRESSURE: 126 MMHG | OXYGEN SATURATION: 90 % | HEIGHT: 67 IN | WEIGHT: 241.84 LBS | TEMPERATURE: 99.4 F | HEART RATE: 75 BPM | DIASTOLIC BLOOD PRESSURE: 61 MMHG | BODY MASS INDEX: 37.96 KG/M2 | RESPIRATION RATE: 20 BRPM

## 2021-08-09 DIAGNOSIS — U07.1 COVID-19: ICD-10-CM

## 2021-08-09 PROCEDURE — 71045 X-RAY EXAM CHEST 1 VIEW: CPT

## 2021-08-09 PROCEDURE — 99283 EMERGENCY DEPT VISIT LOW MDM: CPT

## 2021-08-09 RX ORDER — DIPHENHYDRAMINE HCL 25 MG
30 CAPSULE ORAL EVERY 6 HOURS PRN
Status: SHIPPED | COMMUNITY
End: 2022-06-02

## 2021-08-09 RX ORDER — ACETAMINOPHEN, GUAIFENESIN, AND PHENYLEPHRINE HYDROCHLORIDE 650; 400; 10 MG/20ML; MG/20ML; MG/20ML
20 SOLUTION ORAL EVERY 12 HOURS PRN
Status: SHIPPED | COMMUNITY
End: 2022-06-02

## 2021-08-09 RX ORDER — METHYLPREDNISOLONE 4 MG/1
TABLET ORAL
Qty: 1 EACH | Refills: 0 | Status: SHIPPED | OUTPATIENT
Start: 2021-08-09 | End: 2022-06-02

## 2021-08-09 NOTE — ED PROVIDER NOTES
ED Provider Note    CHIEF COMPLAINT  Chief Complaint   Patient presents with   • Cough     cough and sob  Covid positive x 8 days       HPI  Tano Syed is a 30 y.o. male who presents for evaluation of worsening cough shortness of breath.  The patient just tested positive for Covid approximately 8 days ago at urgent care.  He is an otherwise healthy 30-year-old gentleman.  He is unvaccinated.  He reports ongoing fever body aches cough with some blood-tinged sputum.  No associated strokelike symptoms such as numbness weakness tingling to the arms legs or face.  He has been quarantining at home taking over-the-counter cough and cold medicine with marginal relief    REVIEW OF SYSTEMS  See HPI for further details.  Positive for cough shortness of breath all other systems are negative.     PAST MEDICAL HISTORY  No past medical history on file.  None reported  FAMILY HISTORY  Noncontributory    SOCIAL HISTORY  Social History     Socioeconomic History   • Marital status: Single     Spouse name: Not on file   • Number of children: Not on file   • Years of education: Not on file   • Highest education level: Not on file   Occupational History   • Not on file   Tobacco Use   • Smoking status: Never Smoker   • Smokeless tobacco: Current User     Types: Chew   • Tobacco comment: Rare    Vaping Use   • Vaping Use: Never used   Substance and Sexual Activity   • Alcohol use: Yes     Alcohol/week: 4.8 oz     Types: 5 Cans of beer, 3 Shots of liquor per week     Comment: occ   • Drug use: No     Types: Inhaled     Comment: marijuana   • Sexual activity: Yes     Partners: Female     Birth control/protection: Pill   Other Topics Concern   • Not on file   Social History Narrative   • Not on file     Social Determinants of Health     Financial Resource Strain:    • Difficulty of Paying Living Expenses:    Food Insecurity:    • Worried About Running Out of Food in the Last Year:    • Ran Out of Food in the Last Year:    Transportation  "Needs:    • Lack of Transportation (Medical):    • Lack of Transportation (Non-Medical):    Physical Activity:    • Days of Exercise per Week:    • Minutes of Exercise per Session:    Stress:    • Feeling of Stress :    Social Connections:    • Frequency of Communication with Friends and Family:    • Frequency of Social Gatherings with Friends and Family:    • Attends Restorationism Services:    • Active Member of Clubs or Organizations:    • Attends Club or Organization Meetings:    • Marital Status:    Intimate Partner Violence:    • Fear of Current or Ex-Partner:    • Emotionally Abused:    • Physically Abused:    • Sexually Abused:      No IV drugs   SURGICAL HISTORY  Past Surgical History:   Procedure Laterality Date   • MANDIBLE FRACTURE ORIF Left 9/21/2015    Procedure: MANDIBLE FRACTURE ORIF;  Surgeon: Ryan Machado D.M.D.,MDEEPAK;  Location: SURGERY [facility];  Service:    • OTHER ORTHOPEDIC SURGERY      knee       CURRENT MEDICATIONS  Home Medications     Reviewed by Elana Saldivar (Pharmacy Tech) on 08/09/21 at 0814  Med List Status: Complete   Medication Last Dose Status   DM-Phenylephrine-Acetaminophen (VICKS DAYQUIL COLD & FLU) 10-5-325 MG/15ML Liquid 8/9/2021 Active   Phenylephrine-APAP-guaiFENesin (MUCINEX FAST-MAX) -400 MG/20ML Liquid 8/8/2021 Active            No current facility-administered medications for this encounter.    Current Outpatient Medications:   •  DM-Phenylephrine-Acetaminophen (VICKS DAYQUIL COLD & FLU) 10-5-325 MG/15ML Liquid, Take 30 mL by mouth every 6 hours as needed. Indications: Cough, Fever, Mild Pain, Sore Throat, Disp: , Rfl:   •  Phenylephrine-APAP-guaiFENesin (MUCINEX FAST-MAX) -400 MG/20ML Liquid, Take 20 mL by mouth every 12 hours as needed (COUGH)., Disp: , Rfl:       ALLERGIES  No Known Allergies    PHYSICAL EXAM  VITAL SIGNS: /61   Pulse 77   Temp 37.4 °C (99.4 °F) (Temporal)   Resp 20   Ht 1.702 m (5' 7\")   Wt 110 kg (241 lb 13.5 " oz)   SpO2 92%   BMI 37.88 kg/m²       Constitutional: Well developed, Well nourished, No acute distress, Non-toxic appearance.   HENT: Normocephalic, Atraumatic, Bilateral external ears normal, Oropharynx moist, No oral exudates, Nose normal.   Eyes: PERRLA, EOMI, Conjunctiva normal, No discharge.   Neck: Normal range of motion, No tenderness, Supple, No stridor.   Cardiovascular: Normal heart rate, Normal rhythm, No murmurs, No rubs, No gallops.   Thorax & Lungs: Bibasilar rhonchi, No chest tenderness.   Abdomen: Bowel sounds normal, Soft, No tenderness, No masses, No pulsatile masses.   Skin: Warm, Dry, No erythema, No rash.   Back: No tenderness, No CVA tenderness.   Extremities: Intact distal pulses, No edema, No tenderness, No cyanosis, No clubbing.   Neurologic: Alert & oriented x 3, Normal motor function, Normal sensory function, No focal deficits noted.   Psychiatric: Affect normal, Judgment normal, Mood normal.     DX-CHEST-PORTABLE (1 VIEW)   Final Result      Bilateral pulmonary infiltrates, left greater than right. These findings are consistent with Covid 19 pneumonia.            COURSE & MEDICAL DECISION MAKING  Pertinent Labs & Imaging studies reviewed. (See chart for details)  Patient presents here with ongoing viral URI symptoms.  He has a known recent documented case of COVID-19.  He was initially placed on oxygen in triage but we are able to wean down to room air and he is had consistent room air saturations of 90 to 92%.  Chest x-ray does demonstrate pneumonitis pattern.  He does not have any profound increased work of breathing.  I do not feel that hospitalization is required.  I will place him on a short course of steroids.  I counseled him to return in 12 to 24 hours if symptoms progress or worsen    FINAL IMPRESSION  1.  COVID-19 pneumonitis         Electronically signed by: Reji Bruno M.D., 8/9/2021 8:04 AM

## 2021-08-09 NOTE — ED NOTES
ERP at bedside. Pt agrees with plan of care discussed by ERP. AIDET acknowledged with patient. [place] in low position, side rail up for pt safety. Call light within reach. Will continue to monitor.

## 2021-08-09 NOTE — ED TRIAGE NOTES
Covid positive x 8 days  Coughing up blood tinged sputum and sob. o2 sat 88% on RA and 91% on 3 liters.

## 2022-06-02 ENCOUNTER — OFFICE VISIT (OUTPATIENT)
Dept: URGENT CARE | Facility: PHYSICIAN GROUP | Age: 32
End: 2022-06-02
Payer: COMMERCIAL

## 2022-06-02 VITALS
TEMPERATURE: 97.4 F | SYSTOLIC BLOOD PRESSURE: 122 MMHG | HEART RATE: 94 BPM | WEIGHT: 247 LBS | RESPIRATION RATE: 18 BRPM | HEIGHT: 66 IN | DIASTOLIC BLOOD PRESSURE: 68 MMHG | BODY MASS INDEX: 39.7 KG/M2 | OXYGEN SATURATION: 94 %

## 2022-06-02 DIAGNOSIS — R11.10 VOMITING, INTRACTABILITY OF VOMITING NOT SPECIFIED, PRESENCE OF NAUSEA NOT SPECIFIED, UNSPECIFIED VOMITING TYPE: ICD-10-CM

## 2022-06-02 DIAGNOSIS — K52.9 GASTROENTERITIS: ICD-10-CM

## 2022-06-02 DIAGNOSIS — R19.7 DIARRHEA, UNSPECIFIED TYPE: ICD-10-CM

## 2022-06-02 PROCEDURE — 99214 OFFICE O/P EST MOD 30 MIN: CPT | Performed by: NURSE PRACTITIONER

## 2022-06-02 ASSESSMENT — ENCOUNTER SYMPTOMS
VOMITING: 1
HEADACHES: 0
DIARRHEA: 1
DIZZINESS: 0
FEVER: 0
MYALGIAS: 0
CHILLS: 0
COUGH: 0
NAUSEA: 1
ABDOMINAL PAIN: 1

## 2022-06-02 ASSESSMENT — LIFESTYLE VARIABLES: SUBSTANCE_ABUSE: 0

## 2022-06-02 NOTE — LETTER
June 2, 2022       Patient: Tano Syed   YOB: 1990   Date of Visit: 6/2/2022         To Whom It May Concern:    In my medical opinion, I recommend that Tano Syed return to full duty, no restrictions on 06/03/22.              Sincerely,          EDILBERTO Brownlee  Electronically Signed

## 2022-06-02 NOTE — PROGRESS NOTES
Tano Syed is a 31 y.o. male who presents for Emesis (X2days ), Diarrhea, and Body Aches      HPI Tano is a 30 y/o with c/o not feeling well for a few days. Assoc sx:  vomiting, diarrhea and body aches for 2 days. He tried to go to work yesterday but had to leave work due to not feeling well. Other persons at his job have similar symptoms.  Last time he vomited was last evening. No blood in emesis. Having sharp cramping sensations in his abdomen. Sleeping helps reduce his discomfort. Gatorade helps. Ate a little today without any difficulty. Overall feeling better today.   Denies cough, sore throat, ear pain.   Requesting work excuse letter.     Review of Systems   Constitutional: Negative for chills, fever and malaise/fatigue.   Respiratory: Negative for cough.    Gastrointestinal: Positive for abdominal pain, diarrhea, nausea and vomiting.   Musculoskeletal: Negative for myalgias.   Neurological: Negative for dizziness and headaches.   Endo/Heme/Allergies: Negative for environmental allergies.   Psychiatric/Behavioral: Negative for substance abuse.       Allergies:     No Known Allergies    PMSFS Hx:  History reviewed. No pertinent past medical history.  Past Surgical History:   Procedure Laterality Date   • MANDIBLE FRACTURE ORIF Left 9/21/2015    Procedure: MANDIBLE FRACTURE ORIF;  Surgeon: Ryan Machado D.M.D.,M.D.;  Location: SURGERY [facility];  Service:    • OTHER ORTHOPEDIC SURGERY      knee     History reviewed. No pertinent family history.  Social History     Tobacco Use   • Smoking status: Never Smoker   • Smokeless tobacco: Current User     Types: Chew   • Tobacco comment: Rare    Substance Use Topics   • Alcohol use: Yes     Alcohol/week: 4.8 oz     Types: 5 Cans of beer, 3 Shots of liquor per week     Comment: occ       Problems:   Patient Active Problem List   Diagnosis   • Screen for STD (sexually transmitted disease)   • Obesity (BMI 30-39.9)       Medications:   No current outpatient  "medications on file prior to visit.     No current facility-administered medications on file prior to visit.          Objective:     /68   Pulse 94   Temp 36.3 °C (97.4 °F) (Temporal)   Resp 18   Ht 1.676 m (5' 6\")   Wt 112 kg (247 lb)   SpO2 94%   BMI 39.87 kg/m²     Physical Exam  Vitals and nursing note reviewed.   Constitutional:       General: He is not in acute distress.     Appearance: Normal appearance. He is well-developed. He is not toxic-appearing.   Cardiovascular:      Rate and Rhythm: Normal rate and regular rhythm.      Pulses: Normal pulses.      Heart sounds: Normal heart sounds.   Pulmonary:      Effort: Pulmonary effort is normal.      Breath sounds: Normal breath sounds.   Abdominal:      General: There is no distension.      Palpations: Abdomen is soft.      Tenderness: There is abdominal tenderness (generalized). There is no guarding.   Musculoskeletal:         General: Normal range of motion.   Skin:     General: Skin is warm.      Capillary Refill: Capillary refill takes less than 2 seconds.   Neurological:      Mental Status: He is alert and oriented to person, place, and time.      Deep Tendon Reflexes: Reflexes are normal and symmetric.   Psychiatric:         Mood and Affect: Mood normal.         Behavior: Behavior normal. Behavior is cooperative.         Thought Content: Thought content normal.     Pt called his work to determine type of note he needed. Provider remained in room for his phone call per pt request.     Assessment /Associated Orders:      1. Gastroenteritis     2. Vomiting, intractability of vomiting not specified, presence of nausea not specified, unspecified vomiting type     3. Diarrhea, unspecified type         Medical Decision Making:    Pt is clinically stable at today's acute urgent care visit.  No acute distress noted. Appropriate for outpatient management at this time.   Acute problem today with uncertain prognosis.   OTC peptobismol Dosage and " directions per  for abd cramping, upset stomach.   Keep well hydrated  Consider probiotics   Discussed that this illness was viral in nature. Did not see any evidence of a bacterial process.   Declines COVID PCR test today. Advised that symptoms could be consistent with covid viral infection. Verbalized understanding.     Discussed DDx, management options (risks,benefits, and alternatives to treatment), natural progression and supportive care.  Expressed understanding and the treatment plan was agreed upon. Questions were encouraged and answered   Return to urgent care prn if new or worsening sx or if there is no improvement in condition prn.      I personally reviewed prior external notes and test results pertinent to today's visit.  I have independently reviewed and interpreted all diagnostics ordered during this urgent care acute visit.     Time spent evaluating this patient was at least 35 minutes and includes preparing for visit, counseling/education, exam and evaluation, obtaining history, independent interpretation,medication management and documentation.

## 2023-08-18 ENCOUNTER — APPOINTMENT (OUTPATIENT)
Dept: RADIOLOGY | Facility: MEDICAL CENTER | Age: 33
End: 2023-08-18
Attending: EMERGENCY MEDICINE
Payer: COMMERCIAL

## 2023-08-18 ENCOUNTER — HOSPITAL ENCOUNTER (EMERGENCY)
Facility: MEDICAL CENTER | Age: 33
End: 2023-08-18
Attending: EMERGENCY MEDICINE
Payer: COMMERCIAL

## 2023-08-18 VITALS
RESPIRATION RATE: 14 BRPM | BODY MASS INDEX: 34.62 KG/M2 | DIASTOLIC BLOOD PRESSURE: 59 MMHG | TEMPERATURE: 96.9 F | OXYGEN SATURATION: 95 % | WEIGHT: 215.39 LBS | SYSTOLIC BLOOD PRESSURE: 110 MMHG | HEART RATE: 56 BPM | HEIGHT: 66 IN

## 2023-08-18 DIAGNOSIS — N39.0 ACUTE URINARY TRACT INFECTION: ICD-10-CM

## 2023-08-18 LAB
AMPHET UR QL SCN: NEGATIVE
APPEARANCE UR: CLEAR
BACTERIA #/AREA URNS HPF: NEGATIVE /HPF
BARBITURATES UR QL SCN: NEGATIVE
BENZODIAZ UR QL SCN: NEGATIVE
BILIRUB UR QL STRIP.AUTO: NEGATIVE
BZE UR QL SCN: NEGATIVE
CANNABINOIDS UR QL SCN: NEGATIVE
COLOR UR: ABNORMAL
EPI CELLS #/AREA URNS HPF: ABNORMAL /HPF
FENTANYL UR QL: NEGATIVE
GLUCOSE UR STRIP.AUTO-MCNC: NEGATIVE MG/DL
HYALINE CASTS #/AREA URNS LPF: ABNORMAL /LPF
KETONES UR STRIP.AUTO-MCNC: 15 MG/DL
LEUKOCYTE ESTERASE UR QL STRIP.AUTO: ABNORMAL
METHADONE UR QL SCN: NEGATIVE
MICRO URNS: ABNORMAL
MUCOUS THREADS #/AREA URNS HPF: ABNORMAL /HPF
NITRITE UR QL STRIP.AUTO: NEGATIVE
OPIATES UR QL SCN: NEGATIVE
OXYCODONE UR QL SCN: NEGATIVE
PCP UR QL SCN: NEGATIVE
PH UR STRIP.AUTO: 5.5 [PH] (ref 5–8)
POC BREATHALIZER: 0 PERCENT (ref 0–0.01)
PROPOXYPH UR QL SCN: NEGATIVE
PROT UR QL STRIP: 30 MG/DL
RBC # URNS HPF: ABNORMAL /HPF
RBC UR QL AUTO: NEGATIVE
SP GR UR STRIP.AUTO: 1.03
UROBILINOGEN UR STRIP.AUTO-MCNC: 1 MG/DL
WBC #/AREA URNS HPF: ABNORMAL /HPF

## 2023-08-18 PROCEDURE — A9270 NON-COVERED ITEM OR SERVICE: HCPCS | Performed by: EMERGENCY MEDICINE

## 2023-08-18 PROCEDURE — 700102 HCHG RX REV CODE 250 W/ 637 OVERRIDE(OP): Performed by: EMERGENCY MEDICINE

## 2023-08-18 PROCEDURE — 80307 DRUG TEST PRSMV CHEM ANLYZR: CPT

## 2023-08-18 PROCEDURE — 81001 URINALYSIS AUTO W/SCOPE: CPT

## 2023-08-18 PROCEDURE — 87086 URINE CULTURE/COLONY COUNT: CPT

## 2023-08-18 PROCEDURE — 99284 EMERGENCY DEPT VISIT MOD MDM: CPT

## 2023-08-18 PROCEDURE — 302970 POC BREATHALIZER: Performed by: EMERGENCY MEDICINE

## 2023-08-18 RX ORDER — CEPHALEXIN 500 MG/1
500 CAPSULE ORAL 4 TIMES DAILY
Qty: 40 CAPSULE | Refills: 0 | Status: ACTIVE | OUTPATIENT
Start: 2023-08-18

## 2023-08-18 RX ORDER — CEPHALEXIN 500 MG/1
500 CAPSULE ORAL ONCE
Status: COMPLETED | OUTPATIENT
Start: 2023-08-18 | End: 2023-08-18

## 2023-08-18 RX ADMIN — CEPHALEXIN 500 MG: 500 CAPSULE ORAL at 07:05

## 2023-08-18 NOTE — ED NOTES
Attempted to call pt to notify him that ERP has sent Rx to [facility]'s pharmacy in [place].  Mailbox on cell phone is full and home phone is disconnected.  notified to follow up.

## 2023-08-18 NOTE — ED PROVIDER NOTES
"ED Provider Note    CHIEF COMPLAINT  Chief Complaint   Patient presents with    Other     Brought in by rich, picked up from [address] [facility]. [employer] found patient lying on the grass. No medical complaints. States having Mental health crisis due to stress in life. Denies SI/HI.       EXTERNAL RECORDS REVIEWED  Outpatient Notes patient was seen on 8/9/2021 the last time in the emergency department    HPI/ROS  LIMITATION TO HISTORY   Behavior  OUTSIDE HISTORIAN(S):  EMS    Tano Syed is a 32 y.o. male who presents tonight via EMS after he was found on the grass outside of [facility] on [address] stating that he was having a mental health crisis from excess stress.  There is no signs of trauma and the patient is not intoxicated.    PAST MEDICAL HISTORY  No significant history    SURGICAL HISTORY   has a past surgical history that includes other orthopedic surgery and mandible fracture orif (Left, 9/21/2015).    FAMILY HISTORY  History reviewed. No pertinent family history.    SOCIAL HISTORY  Social History     Tobacco Use    Smoking status: Never    Smokeless tobacco: Current     Types: Chew    Tobacco comments:     Rare    Vaping Use    Vaping Use: Never used   Substance and Sexual Activity    Alcohol use: Yes     Alcohol/week: 4.8 oz     Types: 5 Cans of beer, 3 Shots of liquor per week     Comment: occ    Drug use: No     Types: Inhaled     Comment: marijuana    Sexual activity: Yes     Partners: Female     Birth control/protection: Pill       CURRENT MEDICATIONS  Home Medications       Reviewed by Joe Mckenzie R.N. (Registered Nurse) on 08/18/23 at 0244  Med List Status: Partial     Medication Last Dose Status        Patient Reese Taking any Medications                           ALLERGIES  No Known Allergies    PHYSICAL EXAM  VITAL SIGNS: /59   Pulse (!) 56   Temp 36.1 °C (96.9 °F) (Temporal)   Resp 14   Ht 1.676 m (5' 6\")   Wt 97.7 kg (215 lb 6.2 oz)   SpO2 95%   BMI 34.76 kg/m²    Constitutional: " Patient is well developed, well nourished. Non-toxic appearing. No acute distress.   HENT: Normocephalic, atraumatic. TM's visualized without erythema. Nose normal with no mucosal edema or drainage. Oropharynx moist without erythema or exudates.  Eyes: PERRL, EOMI, Conjunctiva without erythema    Neck: Supple , nontender.  Lymphatic: No lymphadenopathy noted.   Cardiovascular: Normal heart rate and Regular rhythm. No murmur  Thorax & Lungs: Clear and equal breath sounds with good excursion. No respiratory distress,  No chest tenderness,or signs of trauma.  Abdomen: Bowel sounds normal in all four quadrants. Soft,nontender, no rebound , guarding, palpable masses.   Skin: Warm, Dry  Back: No cervical, thoracic, or lumbosacral tenderness.  Extremities: Peripheral pulses 4/4 No edema, No tenderness   Musculoskeletal: Normal range of motion in all major joints. No tenderness to palpation or major deformities noted.   Neurologic: Alert & oriented x 3, Normal motor function, Normal sensory function, No lateralizing or focal deficits noted. DTR's 4/4 bilaterally.  Psychiatric: Affect very odd, difficult historian.  No suicidal or homicidal ideation.    DIAGNOSTIC STUDIES / PROCEDURES      LABS  Results for orders placed or performed during the hospital encounter of 08/18/23   URINALYSIS    Specimen: Urine   Result Value Ref Range    Color DK Yellow     Character Clear     Specific Gravity 1.033 <1.035    Ph 5.5 5.0 - 8.0    Glucose Negative Negative mg/dL    Ketones 15 (A) Negative mg/dL    Protein 30 (A) Negative mg/dL    Bilirubin Negative Negative    Urobilinogen, Urine 1.0 Negative    Nitrite Negative Negative    Leukocyte Esterase Small (A) Negative    Occult Blood Negative Negative    Micro Urine Req Microscopic    URINE DRUG SCREEN   Result Value Ref Range    Amphetamines Urine Negative Negative    Barbiturates Negative Negative    Benzodiazepines Negative Negative    Cocaine Metabolite Negative Negative    Fentanyl,  Urine Negative Negative    Methadone Negative Negative    Opiates Negative Negative    Oxycodone Negative Negative    Phencyclidine -Pcp Negative Negative    Propoxyphene Negative Negative    Cannabinoid Metab Negative Negative   URINE MICROSCOPIC (W/UA)   Result Value Ref Range    WBC 10-20 (A) /hpf    RBC 0-2 (A) /hpf    Bacteria Negative None /hpf    Epithelial Cells Few /hpf    Mucous Threads Few /hpf    Hyaline Cast 6-10 (A) /lpf   POC BREATHALIZER   Result Value Ref Range    POC Breathalizer 0.00 0.00 - 0.01 Percent        RADIOLOGY  I have independently interpreted the diagnostic imaging associated with this visit and am waiting the final reading from the radiologist.   My preliminary interpretation is as follows: Pending  Radiologist interpretation:   CT-HEAD W/O    (Results Pending)        COURSE & MEDICAL DECISION MAKING    ED Observation Status? No; Patient does not meet criteria for ED Observation.     INITIAL ASSESSMENT, COURSE AND PLAN  Care Narrative: Patient was evaluated by behavioral health and after thorough discussion we decided to do a CAT scan of the patient's head secondary to his abnormal behavior despite her normal blood alcohol level and negative urine drug screen.  I ordered a CT head and then found from nursing staff that the patient left without further treatment.  He does have a urinary tract infection I did treat him with Keflex 500 mg orally here with the plans to send him home with a prescription for Keflex for the next 10 days.  He is to increase fluids and have his urine rechecked in 1 week.  Unfortunately he left and did not receive his prescription.        DISPOSITION AND DISCUSSIONS  I have discussed management of the patient with the following physicians and KIARA's: None    Discussion of management with other QHP or appropriate source(s): Behavioral Health        Escalation of care considered, and ultimately not performed:IV fluids, blood analysis, and diagnostic  imaging    Barriers to care at this time, including but not limited to: Patient does not have established PCP.     Decision tools and prescription drugs considered including, but not limited to: Antibiotics Keflex .    FINAL DIAGNOSIS  1. Acute urinary tract infection    2.  Odd behavior       Electronically signed by: Emily Arroyo D.O., 8/18/2023 7:56 AM

## 2023-08-18 NOTE — ED NOTES
Pt from lobby with steady gait. Pt changed into gown, and connected to monitor. Agree with triage note. Chart up for ERP. Call light within reach.

## 2023-08-18 NOTE — DISCHARGE PLANNING
"Alert Team:    Pt is a 33 y/o male BIB [facility] after [employer] found him lying on the grass at the [facility] [place] on [address]. Pt does not have any medical complaints. During triage, pt states he is having a mental health crisis due to stress in life. This writer met with pt and he denied SI/HI/hallucinations. When asked what his needs were, pt unable to answer and states, \"I don't know.\" Pt states he does not recall what happened earlier this morning. Breathalyzer 0.00 and UDS negative for all substances. Findings discussed with ERP who agrees pt safe to discharge to self pending CT scan results. Updated RN. Pt given water.   "

## 2023-08-18 NOTE — ED TRIAGE NOTES
Tano Syed  32 y.o.s  Male  Chief Complaint   Patient presents with    Other     Brought in by rich, picked up from [address] [place] [address]. [employer] found patient lying on the grass. No medical complaints. States having Mental health crisis due to stress in life. Denies SI/HI.

## 2023-08-18 NOTE — ED NOTES
Assumed care of patient, bedside report received from off coming RN    .  Introduced self as pt RN, POC discussed, call light in reach, pt ambulatory with steady gait, pacing room. VSS on RA.

## 2023-08-20 LAB
BACTERIA UR CULT: NORMAL
SIGNIFICANT IND 70042: NORMAL
SITE SITE: NORMAL
SOURCE SOURCE: NORMAL

## 2023-10-26 ENCOUNTER — NON-PROVIDER VISIT (OUTPATIENT)
Dept: URGENT CARE | Facility: PHYSICIAN GROUP | Age: 33
End: 2023-10-26

## 2023-10-26 DIAGNOSIS — Z02.1 PRE-EMPLOYMENT DRUG SCREENING: ICD-10-CM

## 2024-10-28 ENCOUNTER — OFFICE VISIT (OUTPATIENT)
Dept: URGENT CARE | Facility: PHYSICIAN GROUP | Age: 34
End: 2024-10-28
Payer: COMMERCIAL

## 2024-10-28 VITALS
BODY MASS INDEX: 36.64 KG/M2 | WEIGHT: 227 LBS | SYSTOLIC BLOOD PRESSURE: 164 MMHG | TEMPERATURE: 98.1 F | OXYGEN SATURATION: 95 % | HEART RATE: 75 BPM | DIASTOLIC BLOOD PRESSURE: 96 MMHG | RESPIRATION RATE: 16 BRPM

## 2024-10-28 DIAGNOSIS — M25.562 ACUTE PAIN OF LEFT KNEE: ICD-10-CM

## 2024-10-28 PROCEDURE — 3080F DIAST BP >= 90 MM HG: CPT | Performed by: FAMILY MEDICINE

## 2024-10-28 PROCEDURE — 3077F SYST BP >= 140 MM HG: CPT | Performed by: FAMILY MEDICINE

## 2024-10-28 PROCEDURE — 99213 OFFICE O/P EST LOW 20 MIN: CPT | Performed by: FAMILY MEDICINE

## 2025-03-14 ENCOUNTER — HOSPITAL ENCOUNTER (OUTPATIENT)
Facility: MEDICAL CENTER | Age: 35
End: 2025-03-14
Attending: FAMILY MEDICINE
Payer: COMMERCIAL

## 2025-03-14 ENCOUNTER — OFFICE VISIT (OUTPATIENT)
Dept: URGENT CARE | Facility: PHYSICIAN GROUP | Age: 35
End: 2025-03-14
Payer: COMMERCIAL

## 2025-03-14 VITALS
SYSTOLIC BLOOD PRESSURE: 128 MMHG | RESPIRATION RATE: 12 BRPM | WEIGHT: 249.8 LBS | HEIGHT: 72 IN | DIASTOLIC BLOOD PRESSURE: 88 MMHG | HEART RATE: 93 BPM | TEMPERATURE: 98.4 F | BODY MASS INDEX: 33.83 KG/M2 | OXYGEN SATURATION: 97 %

## 2025-03-14 DIAGNOSIS — R36.9 PENILE DISCHARGE: ICD-10-CM

## 2025-03-14 DIAGNOSIS — Z20.2 EXPOSURE TO SEXUALLY TRANSMITTED DISEASE (STD): ICD-10-CM

## 2025-03-14 DIAGNOSIS — Z11.3 SCREENING FOR STDS (SEXUALLY TRANSMITTED DISEASES): ICD-10-CM

## 2025-03-14 PROCEDURE — 3074F SYST BP LT 130 MM HG: CPT | Performed by: FAMILY MEDICINE

## 2025-03-14 PROCEDURE — 87661 TRICHOMONAS VAGINALIS AMPLIF: CPT

## 2025-03-14 PROCEDURE — 3079F DIAST BP 80-89 MM HG: CPT | Performed by: FAMILY MEDICINE

## 2025-03-14 PROCEDURE — 87086 URINE CULTURE/COLONY COUNT: CPT

## 2025-03-14 PROCEDURE — 87491 CHLMYD TRACH DNA AMP PROBE: CPT

## 2025-03-14 PROCEDURE — 87591 N.GONORRHOEAE DNA AMP PROB: CPT

## 2025-03-14 PROCEDURE — 99214 OFFICE O/P EST MOD 30 MIN: CPT | Performed by: FAMILY MEDICINE

## 2025-03-14 RX ORDER — DOXYCYCLINE HYCLATE 100 MG
TABLET ORAL
Qty: 14 TABLET | Refills: 0 | Status: SHIPPED | OUTPATIENT
Start: 2025-03-14 | End: 2025-03-21

## 2025-03-14 NOTE — PROGRESS NOTES
Chief Complaint:    Chief Complaint   Patient presents with    Exposure to STD     Poss exposure Monday 3/10/2025       History of Present Illness:    Questionable penile discharge, questionable dysuria, heard he might have been exposed to Chlamydia. He would like to empirically treat for Gonorrhea, Chlamydia, and UTI and check STI tests.      Past Medical History:    No past medical history on file.    Past Surgical History:    Past Surgical History:   Procedure Laterality Date    MANDIBLE FRACTURE ORIF Left 9/21/2015    Procedure: MANDIBLE FRACTURE ORIF;  Surgeon: Ryan Machado D.M.D.,MDEEPAK;  Location: SURGERY [facility];  Service:     OTHER ORTHOPEDIC SURGERY      knee     Social History:    Social History     Socioeconomic History    Marital status: Single     Spouse name: Not on file    Number of children: Not on file    Years of education: Not on file    Highest education level: Not on file   Occupational History    Not on file   Tobacco Use    Smoking status: Never    Smokeless tobacco: Current     Types: Chew    Tobacco comments:     Rare    Vaping Use    Vaping status: Never Used   Substance and Sexual Activity    Alcohol use: Yes     Alcohol/week: 4.8 oz     Types: 5 Cans of beer, 3 Shots of liquor per week     Comment: occ    Drug use: No     Types: Inhaled     Comment: marijuana    Sexual activity: Yes     Partners: Female     Birth control/protection: Pill   Other Topics Concern    Not on file   Social History Narrative    Not on file     Social Drivers of Health     Financial Resource Strain: Not on file   Food Insecurity: Not on file   Transportation Needs: Not on file   Physical Activity: Not on file   Stress: Not on file   Social Connections: Not on file   Intimate Partner Violence: Not on file   Housing Stability: Not on file     Family History:    No family history on file.    Medications:    No current outpatient medications on file prior to visit.     No current facility-administered  medications on file prior to visit.     Allergies:    No Known Allergies      Vitals:    Vitals:    25 1404   BP: 128/88   Pulse: 93   Resp: 12   Temp: 36.9 °C (98.4 °F)   TempSrc: Temporal   SpO2: 97%   Weight: 113 kg (249 lb 12.8 oz)   Height: 1.829 m (6')       Physical Exam:    Constitutional: Vital signs reviewed. Appears well-developed and well-nourished. No acute distress.   Eyes: Sclera white, conjunctivae clear.   ENT: External ears normal. Hearing normal.   Neck: Neck supple.   Pulmonary/Chest: Respirations non-labored.   Musculoskeletal: Normal gait. No muscular atrophy or weakness.  Neurological: Alert and oriented to person, place, and time.   Skin: No rashes or lesions. Warm, dry, normal turgor.  Psychiatric: Normal mood and affect. Behavior is normal. Judgment and thought content normal.       Assessment / Plan & Medical Decision Makin. Exposure to sexually transmitted disease (STD)  - cefTRIAXone (Rocephin) 500 mg in lidocaine (Xylocaine) 1 % 2 mL for IM use  - doxycycline (VIBRAMYCIN) 100 MG Tab; 1 tab by mouth twice a day x 7 days.  Dispense: 14 Tablet; Refill: 0  - Chlamydia/GC, PCR (Urine); Future  - URINE CULTURE(NEW); Future  - TRICHOMONAS VAGINALIS BY TMA; Future    2. Penile discharge  - cefTRIAXone (Rocephin) 500 mg in lidocaine (Xylocaine) 1 % 2 mL for IM use  - doxycycline (VIBRAMYCIN) 100 MG Tab; 1 tab by mouth twice a day x 7 days.  Dispense: 14 Tablet; Refill: 0  - Chlamydia/GC, PCR (Urine); Future  - URINE CULTURE(NEW); Future  - TRICHOMONAS VAGINALIS BY TMA; Future    3. Screening for STDs (sexually transmitted diseases)  - Chlamydia/GC, PCR (Urine); Future  - URINE CULTURE(NEW); Future  - TRICHOMONAS VAGINALIS BY TMA; Future       Discussed with him DDX, management options, and risks, benefits, and alternatives to treatment plan agreed upon.    Questionable penile discharge, questionable dysuria, heard he might have been exposed to Chlamydia. He would like to empirically  treat for Gonorrhea, Chlamydia, and UTI and check STI tests.    ? etiology of symptoms.     Agreeable to medications given and prescribed and labs ordered for further evaluation.    Advised test results will show in MyChart in a few days.    He will follow-up if needed while waiting for test results.

## 2025-03-15 ENCOUNTER — RESULTS FOLLOW-UP (OUTPATIENT)
Dept: URGENT CARE | Facility: PHYSICIAN GROUP | Age: 35
End: 2025-03-15
Payer: COMMERCIAL

## 2025-03-16 LAB
BACTERIA UR CULT: NORMAL
SIGNIFICANT IND 70042: NORMAL
SITE SITE: NORMAL
SOURCE SOURCE: NORMAL

## 2025-03-18 LAB
SPEC CONTAINER SPEC: NORMAL
SPECIMEN SOURCE: NORMAL
T VAGINALIS RRNA SPEC QL NAA+PROBE: NEGATIVE